# Patient Record
Sex: MALE | Race: WHITE
[De-identification: names, ages, dates, MRNs, and addresses within clinical notes are randomized per-mention and may not be internally consistent; named-entity substitution may affect disease eponyms.]

---
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
## 2023-07-25 ENCOUNTER — HOSPITAL ENCOUNTER (OUTPATIENT)
Dept: HOSPITAL 76 - LAB.N | Age: 18
Discharge: HOME | End: 2023-07-25
Attending: PHYSICIAN ASSISTANT
Payer: MEDICAID

## 2023-07-25 DIAGNOSIS — B02.9: Primary | ICD-10-CM

## 2023-07-25 PROCEDURE — 87252 VIRUS INOCULATION TISSUE: CPT
